# Patient Record
(demographics unavailable — no encounter records)

---

## 2024-11-12 NOTE — PHYSICAL EXAM
[Normal Voice/Communication] : normal voice/communication [Normal Sclera/Conjunctiva] : normal sclera/conjunctiva [Normal Outer Ear/Nose] : the outer ears and nose were normal in appearance [Normal Oropharynx] : the oropharynx was normal [Normal TMs] : both tympanic membranes were normal [Normal] : normal rate, regular rhythm, normal S1 and S2 and no murmur heard [No Edema] : there was no peripheral edema [Soft] : abdomen soft [Non Tender] : non-tender [Non-distended] : non-distended [No Masses] : no abdominal mass palpated [Normal Bowel Sounds] : normal bowel sounds [Normal Posterior Cervical Nodes] : no posterior cervical lymphadenopathy [Normal Anterior Cervical Nodes] : no anterior cervical lymphadenopathy [de-identified] : No sinus tenderness. [de-identified] : Scant wheezing bilaterally, no rales.

## 2024-11-12 NOTE — ASSESSMENT
[FreeTextEntry1] : Patient likely has a viral syndrome, and mild bronchospasm.  Would hold antibiotics at this point.  Respiratory panel sent for flu/covid-19/rsv.  Patient was given an albuterol inhaler, and she can use benzonatate for the cough.  She requested an ENT referral for ear congestion, and an ophthalmology referral and Dermatology referral for routine exams.  There was no indication of otitis media or otitis externa on exam.  Patient is to get adequate rest/sleep, hydration, and nutrition, and call for worsening or nonresolving symptoms.
Yes

## 2024-11-12 NOTE — HISTORY OF PRESENT ILLNESS
[FreeTextEntry8] : For about 2.5 weeks, patient has an ongoing cough which has been phlegmy.  She spits up yellow sputum, with no blood.  Her voice is going out.  No fevers, chills, chest pain, dyspnea.  Her nose sometimes bleeds.  No body aches or rashes.  Patient works as an .  She was in [place] recently.  No sick contacts.

## 2024-11-15 NOTE — HISTORY OF PRESENT ILLNESS
[FreeTextEntry1] : Annual Physical [de-identified] : AUDI JOSEPH is a 50 yo woman with Crohn's disease here for a physical. She has been well overall.  Gyn, mammogram utd 6/24 [facility]. Colonoscopy utd 4/23 at [facility], Seeing GI dr green, now off meds.  Had capsule EGD 6/24.   The patient is single with 2 children.  She is an  working for a non profit.  She would have no difficulty walking 4 to 6 blocks.

## 2024-11-15 NOTE — PHYSICAL EXAM
[No Acute Distress] : no acute distress [Well Nourished] : well nourished [Well Developed] : well developed [Well-Appearing] : well-appearing [Normal Sclera/Conjunctiva] : normal sclera/conjunctiva [EOMI] : extraocular movements intact [Normal Outer Ear/Nose] : the outer ears and nose were normal in appearance [Normal Oropharynx] : the oropharynx was normal [Normal TMs] : both tympanic membranes were normal [Normal Nasal Mucosa] : the nasal mucosa was normal [No Lymphadenopathy] : no lymphadenopathy [Supple] : supple [Thyroid Normal, No Nodules] : the thyroid was normal and there were no nodules present [No Respiratory Distress] : no respiratory distress  [No Accessory Muscle Use] : no accessory muscle use [Clear to Auscultation] : lungs were clear to auscultation bilaterally [Normal Rate] : normal rate  [Regular Rhythm] : with a regular rhythm [Normal S1, S2] : normal S1 and S2 [No Murmur] : no murmur heard [No Edema] : there was no peripheral edema [Normal Appearance] : normal in appearance [No Masses] : no palpable masses [No Nipple Discharge] : no nipple discharge [No Axillary Lymphadenopathy] : no axillary lymphadenopathy [Soft] : abdomen soft [Non Tender] : non-tender [Non-distended] : non-distended [Normal Bowel Sounds] : normal bowel sounds [Normal Supraclavicular Nodes] : no supraclavicular lymphadenopathy [Normal Posterior Cervical Nodes] : no posterior cervical lymphadenopathy [Normal Anterior Cervical Nodes] : no anterior cervical lymphadenopathy [No CVA Tenderness] : no CVA  tenderness [Normal Gait] : normal gait [Speech Grossly Normal] : speech grossly normal [Memory Grossly Normal] : memory grossly normal [Normal Affect] : the affect was normal [Alert and Oriented x3] : oriented to person, place, and time [Normal Mood] : the mood was normal [Normal Insight/Judgement] : insight and judgment were intact

## 2024-11-15 NOTE — ASSESSMENT
[FreeTextEntry1] : [facility] Labs drawn in office today Gyn, mammogram, colonoscopy utd consider flu cpx in 1 year

## 2024-11-15 NOTE — HEALTH RISK ASSESSMENT
[Good] : ~his/her~  mood as  good [No] : In the past 12 months have you used drugs other than those required for medical reasons? No [No falls in past year] : Patient reported no falls in the past year [0] : 2) Feeling down, depressed, or hopeless: Not at all (0) [Former] : Former [< 15 Years] : < 15 Years [Patient reported mammogram was normal] : Patient reported mammogram was normal [None] : None [With Family] : lives with family [Single] : single [Feels Safe at Home] : Feels safe at home [Fully functional (bathing, dressing, toileting, transferring, walking, feeding)] : Fully functional (bathing, dressing, toileting, transferring, walking, feeding) [Fully functional (using the telephone, shopping, preparing meals, housekeeping, doing laundry, using] : Fully functional and needs no help or supervision to perform IADLs (using the telephone, shopping, preparing meals, housekeeping, doing laundry, using transportation, managing medications and managing finances) [Smoke Detector] : smoke detector [Carbon Monoxide Detector] : carbon monoxide detector [Seat Belt] :  uses seat belt [de-identified] : active [de-identified] : regular [[id number]] : 0 [de-identified] : 2 cigarettes daily, quit 4/23 [Reports changes in hearing] : Reports no changes in hearing [Reports changes in vision] : Reports no changes in vision [Reports changes in dental health] : Reports no changes in dental health [MammogramDate] : 6/24 [ColonoscopyDate] : 4/23

## 2024-11-15 NOTE — DATA REVIEWED
[FreeTextEntry1] : NSR 72 no st/t changes Spiral Flap Text: The defect edges were debeveled with a #15 scalpel blade.  Given the location of the defect, shape of the defect and the proximity to free margins a spiral flap was deemed most appropriate.  Using a sterile surgical marker, an appropriate rotation flap was drawn incorporating the defect and placing the expected incisions within the relaxed skin tension lines where possible. The area thus outlined was incised deep to adipose tissue with a #15 scalpel blade.  The skin margins were undermined to an appropriate distance in all directions utilizing iris scissors.

## 2025-01-08 NOTE — ASSESSMENT
[FreeTextEntry1] : Patient 52-year-old female complaining of intermittently clogged ears also intermittent tinnitus not sure which side here for evaluation on examination ears are perfectly normal endoscopically she has a significantly deviated septum in the left nasal cavity about 90% obstructed very swollen turbinates I put her on Flonase nasal spray with the presumed diagnosis of eustachian tube dysfunction after an audiogram showed essentially normal hearing except for slight dip in the high frequencies consistent with early presbycusis.  Patient was reassured will follow-up with us as needed I do recommend a septoplasty in the future if her difficulty breathing becomes an issue for her.

## 2025-01-08 NOTE — REVIEW OF SYSTEMS
[Negative] : Heme/Lymph [Ear Noises] : ear noises [As Noted in HPI] : as noted in HPI [Nasal Congestion] : nasal congestion [de-identified] : ear clogging

## 2025-01-08 NOTE — CONSULT LETTER
[Dear  ___] : Dear  [unfilled], [Consult Letter:] : I had the pleasure of evaluating your patient, [unfilled]. [Please see my note below.] : Please see my note below. [Consult Closing:] : Thank you very much for allowing me to participate in the care of this patient.  If you have any questions, please do not hesitate to contact me. [Sincerely,] : Sincerely, [FreeTextEntry3] : Tod Bah MD [facility] Associated Professor, [employer]

## 2025-01-08 NOTE — HISTORY OF PRESENT ILLNESS
[de-identified] : Patient has had a recurrent ear pressure, fullness and ringing in the ears. She has not had any dizziness or vertigo with these symptoms. She does have nasal dryness frequently. She does not current use any nasal sprays or saline. She does have issues breathing through the nose, switches sides, but never breathes through both sides.